# Patient Record
Sex: MALE
[De-identification: names, ages, dates, MRNs, and addresses within clinical notes are randomized per-mention and may not be internally consistent; named-entity substitution may affect disease eponyms.]

---

## 2024-07-16 ENCOUNTER — RESULT REVIEW (OUTPATIENT)
Age: 64
End: 2024-07-16

## 2024-07-16 ENCOUNTER — APPOINTMENT (OUTPATIENT)
Dept: RADIOLOGY | Facility: CLINIC | Age: 64
End: 2024-07-16

## 2024-07-16 ENCOUNTER — OUTPATIENT (OUTPATIENT)
Dept: OUTPATIENT SERVICES | Facility: HOSPITAL | Age: 64
LOS: 1 days | End: 2024-07-16
Payer: MEDICAID

## 2024-07-16 ENCOUNTER — APPOINTMENT (OUTPATIENT)
Dept: RHEUMATOLOGY | Facility: CLINIC | Age: 64
End: 2024-07-16
Payer: MEDICAID

## 2024-07-16 VITALS
SYSTOLIC BLOOD PRESSURE: 134 MMHG | WEIGHT: 200 LBS | HEART RATE: 78 BPM | BODY MASS INDEX: 30.31 KG/M2 | HEIGHT: 68 IN | OXYGEN SATURATION: 96 % | DIASTOLIC BLOOD PRESSURE: 73 MMHG | TEMPERATURE: 97.2 F

## 2024-07-16 DIAGNOSIS — M79.89 OTHER SPECIFIED SOFT TISSUE DISORDERS: ICD-10-CM

## 2024-07-16 DIAGNOSIS — L40.9 PSORIASIS, UNSPECIFIED: ICD-10-CM

## 2024-07-16 PROBLEM — Z00.00 ENCOUNTER FOR PREVENTIVE HEALTH EXAMINATION: Status: ACTIVE | Noted: 2024-07-16

## 2024-07-16 PROCEDURE — 99205 OFFICE O/P NEW HI 60 MIN: CPT

## 2024-07-16 PROCEDURE — 73660 X-RAY EXAM OF TOE(S): CPT | Mod: 26,50

## 2024-07-16 PROCEDURE — G2211 COMPLEX E/M VISIT ADD ON: CPT | Mod: NC,1L

## 2024-07-16 PROCEDURE — 73600 X-RAY EXAM OF ANKLE: CPT | Mod: 26,50

## 2024-07-16 PROCEDURE — 72190 X-RAY EXAM OF PELVIS: CPT | Mod: 26

## 2024-07-17 LAB
ALBUMIN SERPL ELPH-MCNC: 4.2 G/DL
ALP BLD-CCNC: 74 U/L
ALT SERPL-CCNC: 22 U/L
ANION GAP SERPL CALC-SCNC: 14 MMOL/L
AST SERPL-CCNC: 22 U/L
BILIRUB SERPL-MCNC: 0.4 MG/DL
BUN SERPL-MCNC: 11 MG/DL
CALCIUM SERPL-MCNC: 8.9 MG/DL
CHLORIDE SERPL-SCNC: 104 MMOL/L
CO2 SERPL-SCNC: 24 MMOL/L
CREAT SERPL-MCNC: 0.84 MG/DL
CRP SERPL-MCNC: 3 MG/L
EGFR: 97 ML/MIN/1.73M2
ERYTHROCYTE [SEDIMENTATION RATE] IN BLOOD BY WESTERGREN METHOD: 19 MM/HR
GLUCOSE SERPL-MCNC: 83 MG/DL
HCT VFR BLD CALC: 41.8 %
HGB BLD-MCNC: 13.2 G/DL
MCHC RBC-ENTMCNC: 29.3 PG
MCHC RBC-ENTMCNC: 31.6 GM/DL
MCV RBC AUTO: 92.7 FL
PLATELET # BLD AUTO: 202 K/UL
POTASSIUM SERPL-SCNC: 4.2 MMOL/L
PROT SERPL-MCNC: 7 G/DL
RBC # BLD: 4.51 M/UL
RBC # FLD: 15.7 %
RHEUMATOID FACT SER QL: 30 IU/ML
SODIUM SERPL-SCNC: 142 MMOL/L
URATE SERPL-MCNC: 4.4 MG/DL
WBC # FLD AUTO: 5.07 K/UL

## 2024-07-18 LAB
CCP AB SER IA-ACNC: <8 UNITS
HLA-B27 QL NAA+PROBE: NORMAL
RF+CCP IGG SER-IMP: NEGATIVE

## 2024-07-31 ENCOUNTER — APPOINTMENT (OUTPATIENT)
Dept: RHEUMATOLOGY | Facility: CLINIC | Age: 64
End: 2024-07-31

## 2024-07-31 DIAGNOSIS — L40.50 ARTHROPATHIC PSORIASIS, UNSPECIFIED: ICD-10-CM

## 2024-07-31 PROCEDURE — 99215 OFFICE O/P EST HI 40 MIN: CPT

## 2024-07-31 PROCEDURE — G2211 COMPLEX E/M VISIT ADD ON: CPT | Mod: NC,1L

## 2024-07-31 RX ORDER — METHOTREXATE 2.5 MG/1
2.5 TABLET ORAL
Qty: 26 | Refills: 1 | Status: ACTIVE | COMMUNITY
Start: 2024-07-31 | End: 1900-01-01

## 2024-07-31 RX ORDER — PREDNISONE 5 MG/1
5 TABLET ORAL DAILY
Qty: 30 | Refills: 0 | Status: ACTIVE | COMMUNITY
Start: 2024-07-31 | End: 1900-01-01

## 2024-07-31 RX ORDER — FOLIC ACID 1 MG/1
1 TABLET ORAL DAILY
Qty: 90 | Refills: 1 | Status: ACTIVE | COMMUNITY
Start: 2024-07-31 | End: 1900-01-01

## 2024-07-31 NOTE — HISTORY OF PRESENT ILLNESS
[___ Week(s) Ago] : [unfilled] week(s) ago [FreeTextEntry1] : CC  L hand/ wrist  swelling and pain   HPI: 63 y old M with PMH of Cardiac arrest admitted at [facility] 2017 s/p AICD placement for arrythmia as per patient, no records available to review, followed by Cardiology Cardiology  Dr Diez at [facility]  and PMD  Dr VAHE Wilder   [phone]   3 weeks ago started to have pain and swelling  L 3 digit followed with PMD , tx Keflex for one week had  followed by Dr [facility] private [facility] - 7/2024 , agreed to cw Keflex for one week and then also given steroids , finished one week of antibiotics pain improved but still swollen and trouble swallowing worse in the morning  no other joint pain or swelling  occasional foot pain  no lower back pain  history of Skin psoriasis   denies recent procedure no skin wounds no fever, no trauma,  + skin psoriasis, no history of uveitis, denies sob or chest pain, no cough no recurrent infections, no sicca symptoms , no Ryanaud  no oral ulcers, no trouble with swallowing, no abd pain no blood in urine or stool rest of the review symptoms negative    Medications:  Metoprolol  Eliquis  Lipitor twice a week   FH: no RA, no Gout, Psoriasis  SH: no toxic habits  ALL: NKDA

## 2024-07-31 NOTE — DATA REVIEWED
[FreeTextEntry1] : low positive RF 30 , negative CCP, Negative HLA B 27  Uric acid 4.4  normal CRP, and ESR  normal AST and ALT   BL foot x ray and Pelvic x ray. 7/1624   Hammertoe deformities are noted of the lesser digits bilaterally. Mild dorsal midfoot spurring bilaterally but more prominently on the right. Posterior plantar calcaneal spurring is noted bilaterally.  No fracture or erosive change.  Minimal degenerative change of the left hip. Mild lower lumbar degenerative disc disease.

## 2024-07-31 NOTE — ASSESSMENT
[FreeTextEntry1] : 63 y old M with PMH of Cardiac Arrest due to arrhythmia s/p AICD 2017 at [facility] Tx metoprolol and Eliquis ,  Skin psoriasis, with L hand swelling and pain for last 3 weeks no trauma never had similar symptoms in the past , intermittent pain of foot , tx Keflex one  week partial improvement of pain persistent swelling and pain worse in the morning concerning for inflammatory arthritis possible crystal vs psoriatic arthritis , less likely infectious as no source for infection and multiarticular involvement without skin wound or recent procedure  -evaluated by orthopedic surgery recently advised to send us records, was told x ray was no sig changes of L hand -with evaluation had normal ESR and CRP  and  -negative HLA B 27 -low positive RF 30 and negative CCP  -7/16 24 pelvic x ray for SI joint evaluation showed mild DJD hip joints no sig changes of SI joints  -and hand x ray showed -bl foot /ankle x ray to evaluate for DJD vs inflammatory  -for hand arthritis pain and swelling of 3 digit and bl hand stiffness before prednisone was 10 out of 10 improved down to 2 with 6 day course of prednisone 40 mg took for 2 days and then 20 mg and stopped 7/24 -start Methotrexate 15 mg weekly 6 tablets once a week for psoriatic arthritis, side affects discussed, GI symptoms and affects on bone marrow and increased infections with rest of the side affects, will monitor blood work 4 weeks after starting medications -folic acid 1 mg daily with Methotrexate  -if recurrent or worse pain can start low dose prednisone 5 mg daily if needed   -follow up 4 weeks in person

## 2024-09-09 ENCOUNTER — NON-APPOINTMENT (OUTPATIENT)
Age: 64
End: 2024-09-09

## 2024-09-10 ENCOUNTER — APPOINTMENT (OUTPATIENT)
Dept: RHEUMATOLOGY | Facility: CLINIC | Age: 64
End: 2024-09-10
Payer: MEDICAID

## 2024-09-10 VITALS
TEMPERATURE: 97.2 F | HEIGHT: 68 IN | OXYGEN SATURATION: 97 % | BODY MASS INDEX: 30.31 KG/M2 | SYSTOLIC BLOOD PRESSURE: 121 MMHG | DIASTOLIC BLOOD PRESSURE: 75 MMHG | HEART RATE: 71 BPM | WEIGHT: 200 LBS

## 2024-09-10 DIAGNOSIS — L40.50 ARTHROPATHIC PSORIASIS, UNSPECIFIED: ICD-10-CM

## 2024-09-10 PROCEDURE — G2211 COMPLEX E/M VISIT ADD ON: CPT | Mod: NC

## 2024-09-10 PROCEDURE — 99215 OFFICE O/P EST HI 40 MIN: CPT

## 2024-09-10 NOTE — PHYSICAL EXAM
[General Appearance - In No Acute Distress] : in no acute distress [PERRL With Normal Accommodation] : pupils were equal in size, round, and reactive to light [Examination Of The Oral Cavity] : the lips and gums were normal [Oropharynx] : the oropharynx was normal [] : the neck was supple [Respiration, Rhythm And Depth] : normal respiratory rhythm and effort [Auscultation Breath Sounds / Voice Sounds] : lungs were clear to auscultation bilaterally [Chest Palpation] : palpation of the chest revealed no abnormalities [Heart Rate And Rhythm] : heart rate was normal and rhythm regular [Heart Sounds] : normal S1 and S2 [Murmurs] : no murmurs [Edema] : there was no peripheral edema [Bowel Sounds] : normal bowel sounds [Abdomen Soft] : soft [Abdomen Tenderness] : non-tender [No Spinal Tenderness] : no spinal tenderness [Motor Tone] : muscle strength and tone were normal [FreeTextEntry1] : bl elbow scaly rash  [Motor Exam] : the motor exam was normal

## 2024-09-10 NOTE — HISTORY OF PRESENT ILLNESS
[FreeTextEntry1] : 9/10/24 follow up with me much improved pain and swelling of the hands, persistent but improved L had 3 digit swelling and pain 90 percent improvement overall  persistent scaly rash bl elbows and minimal redness around the nose, using topical steroid cream  rest of the review  system negative  tolerating Methotrexate 6 tablets weekly with FA 1 mg daily since 7/31/24 tolerating well , off prednisone  7/16/24  negative HLA B27 , Uric acid 4,4, negative CCP, mildly increased RF 30 , normal ESR and CRP  BL foot and ankle and Pelvis x ray : showing changes suggestive of DJD

## 2024-09-10 NOTE — DATA REVIEWED
[FreeTextEntry1] : low positive RF 30 , negative CCP, Negative HLA B 27  Uric acid 4.4  normal CRP, and ESR  normal AST and ALT   BL foot x ray and Pelvic x ray. 7/1624   Hammertoe deformities are noted of the lesser digits bilaterally. Mild dorsal midfoot spurring bilaterally but more prominently on the right. Posterior plantar calcaneal spurring is noted bilaterally.  No fracture or erosive change.  Minimal degenerative change of the left hip. Mild lower lumbar degenerative disc disease. 
Private car

## 2024-09-10 NOTE — ASSESSMENT
[FreeTextEntry1] : 63 y old M with PMH of Cardiac Arrest due to arrhythmia s/p AICD 2017 at [facility] Tx metoprolol and Eliquis ,  Skin psoriasis, with L hand swelling and pain for last 3 weeks no trauma never had similar symptoms in the past , intermittent pain of foot , tx Keflex one  week partial improvement of pain persistent swelling and pain worse in the morning concerning for inflammatory arthritis possible crystal vs psoriatic arthritis , less likely infectious as no source for infection and multiarticular involvement without skin wound or recent procedure  -evaluated by orthopedic surgery recently advised to send us records, was told x ray was no sig changes of L hand -with workup 7/16/24 negative CCP , HLA B27 low positive RF 30 normal ESR and CRP -BL foot and Pelvic x ray showed changes suggestive of DJD no erosive disease  -7/16 24 pelvic x ray for SI joint evaluation showed mild DJD hip joints no sig changes of SI joints  -bl hand swelling and pain improved with prednisone and 7/31/24 started on Methotrexate for possible psoriatic arthritis with 90 % improved symotoms,  with persistent but markedly improved pain and swelling of L hand 3 PIP joint , persistent scaly elbow rash  -tolerating Methotrexate 15 mg weekly with FA 1 mg daily well advised to increase to 20 mg weekly and cw FA 1 mg daily, side affects discussed -repeat CBC, CMP , ESR and CRP  -monitor blood work 2-3 month  -MTX side affects discussed, GI symptoms and affects on bone marrow and increased infections with rest of the side affects, will monitor blood work 4 weeks after starting medications   -follow up 2-3 month

## 2024-09-11 LAB
ALBUMIN SERPL ELPH-MCNC: 4.1 G/DL
ALP BLD-CCNC: 79 U/L
ALT SERPL-CCNC: 24 U/L
ANION GAP SERPL CALC-SCNC: 13 MMOL/L
AST SERPL-CCNC: 24 U/L
BILIRUB SERPL-MCNC: 0.4 MG/DL
BUN SERPL-MCNC: 13 MG/DL
CALCIUM SERPL-MCNC: 9.5 MG/DL
CHLORIDE SERPL-SCNC: 104 MMOL/L
CHOLEST SERPL-MCNC: 120 MG/DL
CO2 SERPL-SCNC: 23 MMOL/L
CREAT SERPL-MCNC: 0.92 MG/DL
CRP SERPL-MCNC: <3 MG/L
EGFR: 93 ML/MIN/1.73M2
ERYTHROCYTE [SEDIMENTATION RATE] IN BLOOD BY WESTERGREN METHOD: 15 MM/HR
GLUCOSE SERPL-MCNC: 89 MG/DL
HCT VFR BLD CALC: 43 %
HDLC SERPL-MCNC: 43 MG/DL
HGB BLD-MCNC: 13.8 G/DL
LDLC SERPL CALC-MCNC: 61 MG/DL
MCHC RBC-ENTMCNC: 30.2 PG
MCHC RBC-ENTMCNC: 32.1 GM/DL
MCV RBC AUTO: 94.1 FL
NONHDLC SERPL-MCNC: 77 MG/DL
PLATELET # BLD AUTO: 170 K/UL
POTASSIUM SERPL-SCNC: 4.2 MMOL/L
PROT SERPL-MCNC: 6.5 G/DL
RBC # BLD: 4.57 M/UL
RBC # FLD: 16.1 %
SODIUM SERPL-SCNC: 140 MMOL/L
TRIGL SERPL-MCNC: 79 MG/DL
WBC # FLD AUTO: 3.2 K/UL

## 2024-12-10 ENCOUNTER — APPOINTMENT (OUTPATIENT)
Dept: RHEUMATOLOGY | Facility: CLINIC | Age: 64
End: 2024-12-10
Payer: MEDICAID

## 2024-12-10 VITALS
DIASTOLIC BLOOD PRESSURE: 79 MMHG | OXYGEN SATURATION: 96 % | TEMPERATURE: 97 F | WEIGHT: 204 LBS | SYSTOLIC BLOOD PRESSURE: 128 MMHG | HEART RATE: 55 BPM | HEIGHT: 68 IN | BODY MASS INDEX: 30.92 KG/M2

## 2024-12-10 PROCEDURE — G2211 COMPLEX E/M VISIT ADD ON: CPT | Mod: NC

## 2024-12-10 PROCEDURE — 99215 OFFICE O/P EST HI 40 MIN: CPT

## 2024-12-11 LAB
ALBUMIN SERPL ELPH-MCNC: 4.4 G/DL
ALP BLD-CCNC: 82 U/L
ALT SERPL-CCNC: 21 U/L
ANION GAP SERPL CALC-SCNC: 11 MMOL/L
AST SERPL-CCNC: 26 U/L
BILIRUB SERPL-MCNC: 0.4 MG/DL
BUN SERPL-MCNC: 10 MG/DL
CALCIUM SERPL-MCNC: 9.9 MG/DL
CHLORIDE SERPL-SCNC: 102 MMOL/L
CO2 SERPL-SCNC: 26 MMOL/L
CREAT SERPL-MCNC: 0.92 MG/DL
CRP SERPL-MCNC: <3 MG/L
EGFR: 92 ML/MIN/1.73M2
ERYTHROCYTE [SEDIMENTATION RATE] IN BLOOD BY WESTERGREN METHOD: 7 MM/HR
GLUCOSE SERPL-MCNC: 90 MG/DL
HCT VFR BLD CALC: 41.8 %
HGB BLD-MCNC: 13.4 G/DL
MCHC RBC-ENTMCNC: 30.9 PG
MCHC RBC-ENTMCNC: 32.1 G/DL
MCV RBC AUTO: 96.3 FL
PLATELET # BLD AUTO: 172 K/UL
POTASSIUM SERPL-SCNC: 4.7 MMOL/L
PROT SERPL-MCNC: 6.9 G/DL
RBC # BLD: 4.34 M/UL
RBC # FLD: 15.6 %
SODIUM SERPL-SCNC: 139 MMOL/L
WBC # FLD AUTO: 3.51 K/UL

## 2025-02-25 ENCOUNTER — APPOINTMENT (OUTPATIENT)
Dept: RHEUMATOLOGY | Facility: CLINIC | Age: 65
End: 2025-02-25
Payer: MEDICAID

## 2025-02-25 ENCOUNTER — NON-APPOINTMENT (OUTPATIENT)
Age: 65
End: 2025-02-25

## 2025-02-25 VITALS
WEIGHT: 200 LBS | HEIGHT: 68 IN | OXYGEN SATURATION: 98 % | BODY MASS INDEX: 30.31 KG/M2 | SYSTOLIC BLOOD PRESSURE: 135 MMHG | DIASTOLIC BLOOD PRESSURE: 85 MMHG | HEART RATE: 60 BPM | TEMPERATURE: 97.2 F

## 2025-02-25 DIAGNOSIS — L40.50 ARTHROPATHIC PSORIASIS, UNSPECIFIED: ICD-10-CM

## 2025-02-25 PROCEDURE — 99215 OFFICE O/P EST HI 40 MIN: CPT

## 2025-02-25 PROCEDURE — G2211 COMPLEX E/M VISIT ADD ON: CPT | Mod: NC

## 2025-02-25 RX ORDER — LEFLUNOMIDE 10 MG/1
10 TABLET, FILM COATED ORAL DAILY
Qty: 30 | Refills: 3 | Status: ACTIVE | COMMUNITY
Start: 2025-02-25 | End: 1900-01-01

## 2025-02-26 LAB
ALBUMIN SERPL ELPH-MCNC: 4.2 G/DL
ALP BLD-CCNC: 73 U/L
ALT SERPL-CCNC: 28 U/L
ANION GAP SERPL CALC-SCNC: 12 MMOL/L
AST SERPL-CCNC: 28 U/L
BILIRUB SERPL-MCNC: 0.4 MG/DL
BUN SERPL-MCNC: 11 MG/DL
CALCIUM SERPL-MCNC: 9.2 MG/DL
CHLORIDE SERPL-SCNC: 102 MMOL/L
CO2 SERPL-SCNC: 26 MMOL/L
CREAT SERPL-MCNC: 0.9 MG/DL
CRP SERPL-MCNC: <3 MG/L
EGFR: 95 ML/MIN/1.73M2
ERYTHROCYTE [SEDIMENTATION RATE] IN BLOOD BY WESTERGREN METHOD: 6 MM/HR
GLUCOSE SERPL-MCNC: 89 MG/DL
HCT VFR BLD CALC: 41.8 %
HGB BLD-MCNC: 13.2 G/DL
MCHC RBC-ENTMCNC: 30.9 PG
MCHC RBC-ENTMCNC: 31.6 G/DL
MCV RBC AUTO: 97.9 FL
PLATELET # BLD AUTO: 151 K/UL
POTASSIUM SERPL-SCNC: 4.1 MMOL/L
PROT SERPL-MCNC: 6.4 G/DL
RBC # BLD: 4.27 M/UL
RBC # FLD: 16 %
SODIUM SERPL-SCNC: 140 MMOL/L
WBC # FLD AUTO: 4.13 K/UL

## 2025-04-01 ENCOUNTER — APPOINTMENT (OUTPATIENT)
Dept: RHEUMATOLOGY | Facility: CLINIC | Age: 65
End: 2025-04-01
Payer: MEDICAID

## 2025-04-01 VITALS
OXYGEN SATURATION: 97 % | HEIGHT: 68 IN | WEIGHT: 205 LBS | TEMPERATURE: 97.8 F | SYSTOLIC BLOOD PRESSURE: 139 MMHG | DIASTOLIC BLOOD PRESSURE: 79 MMHG | BODY MASS INDEX: 31.07 KG/M2 | HEART RATE: 68 BPM

## 2025-04-01 PROCEDURE — G2211 COMPLEX E/M VISIT ADD ON: CPT | Mod: NC

## 2025-04-01 PROCEDURE — 99215 OFFICE O/P EST HI 40 MIN: CPT

## 2025-04-02 LAB
ALBUMIN SERPL ELPH-MCNC: 4.2 G/DL
ALP BLD-CCNC: 92 U/L
ALT SERPL-CCNC: 40 U/L
ANION GAP SERPL CALC-SCNC: 11 MMOL/L
AST SERPL-CCNC: 32 U/L
BILIRUB SERPL-MCNC: 0.4 MG/DL
BUN SERPL-MCNC: 11 MG/DL
CALCIUM SERPL-MCNC: 9.2 MG/DL
CHLORIDE SERPL-SCNC: 103 MMOL/L
CO2 SERPL-SCNC: 26 MMOL/L
CREAT SERPL-MCNC: 0.87 MG/DL
CRP SERPL-MCNC: <3 MG/L
EGFRCR SERPLBLD CKD-EPI 2021: 96 ML/MIN/1.73M2
ERYTHROCYTE [SEDIMENTATION RATE] IN BLOOD BY WESTERGREN METHOD: 7 MM/HR
GLUCOSE SERPL-MCNC: 79 MG/DL
HCT VFR BLD CALC: 41 %
HGB BLD-MCNC: 13.5 G/DL
MCHC RBC-ENTMCNC: 30.7 PG
MCHC RBC-ENTMCNC: 32.9 G/DL
MCV RBC AUTO: 93.2 FL
PLATELET # BLD AUTO: 161 K/UL
POTASSIUM SERPL-SCNC: 4.1 MMOL/L
PROT SERPL-MCNC: 6.7 G/DL
RBC # BLD: 4.4 M/UL
RBC # FLD: 15.9 %
SODIUM SERPL-SCNC: 139 MMOL/L
WBC # FLD AUTO: 3.83 K/UL

## 2025-06-10 ENCOUNTER — OUTPATIENT (OUTPATIENT)
Dept: OUTPATIENT SERVICES | Facility: HOSPITAL | Age: 65
LOS: 1 days | End: 2025-06-10
Payer: MEDICARE

## 2025-06-10 ENCOUNTER — APPOINTMENT (OUTPATIENT)
Dept: RADIOLOGY | Facility: CLINIC | Age: 65
End: 2025-06-10

## 2025-06-10 ENCOUNTER — APPOINTMENT (OUTPATIENT)
Dept: RHEUMATOLOGY | Facility: CLINIC | Age: 65
End: 2025-06-10
Payer: MEDICARE

## 2025-06-10 VITALS
SYSTOLIC BLOOD PRESSURE: 118 MMHG | WEIGHT: 207 LBS | DIASTOLIC BLOOD PRESSURE: 78 MMHG | HEART RATE: 70 BPM | BODY MASS INDEX: 31.37 KG/M2 | TEMPERATURE: 97.8 F | OXYGEN SATURATION: 98 % | HEIGHT: 68 IN

## 2025-06-10 PROCEDURE — 72100 X-RAY EXAM L-S SPINE 2/3 VWS: CPT | Mod: 26

## 2025-06-10 PROCEDURE — 36415 COLL VENOUS BLD VENIPUNCTURE: CPT

## 2025-06-10 PROCEDURE — G2211 COMPLEX E/M VISIT ADD ON: CPT

## 2025-06-10 PROCEDURE — 99215 OFFICE O/P EST HI 40 MIN: CPT

## 2025-06-10 PROCEDURE — 73521 X-RAY EXAM HIPS BI 2 VIEWS: CPT | Mod: 26

## 2025-06-10 PROCEDURE — 99205 OFFICE O/P NEW HI 60 MIN: CPT

## 2025-06-11 LAB
ALBUMIN SERPL ELPH-MCNC: 4 G/DL
ALP BLD-CCNC: 101 U/L
ALT SERPL-CCNC: 50 U/L
ANION GAP SERPL CALC-SCNC: 14 MMOL/L
AST SERPL-CCNC: 41 U/L
BILIRUB SERPL-MCNC: 0.3 MG/DL
BUN SERPL-MCNC: 11 MG/DL
CALCIUM SERPL-MCNC: 9 MG/DL
CHLORIDE SERPL-SCNC: 100 MMOL/L
CO2 SERPL-SCNC: 24 MMOL/L
CREAT SERPL-MCNC: 0.9 MG/DL
CRP SERPL-MCNC: <3 MG/L
EGFRCR SERPLBLD CKD-EPI 2021: 95 ML/MIN/1.73M2
ERYTHROCYTE [SEDIMENTATION RATE] IN BLOOD BY WESTERGREN METHOD: 10 MM/HR
GLUCOSE SERPL-MCNC: 95 MG/DL
HCT VFR BLD CALC: 40.8 %
HGB BLD-MCNC: 12.9 G/DL
MCHC RBC-ENTMCNC: 30.2 PG
MCHC RBC-ENTMCNC: 31.6 G/DL
MCV RBC AUTO: 95.6 FL
PLATELET # BLD AUTO: 157 K/UL
POTASSIUM SERPL-SCNC: 4.2 MMOL/L
PROT SERPL-MCNC: 6.3 G/DL
RBC # BLD: 4.27 M/UL
RBC # FLD: 16.1 %
SODIUM SERPL-SCNC: 138 MMOL/L
WBC # FLD AUTO: 3.62 K/UL

## 2025-08-26 ENCOUNTER — APPOINTMENT (OUTPATIENT)
Dept: RHEUMATOLOGY | Facility: CLINIC | Age: 65
End: 2025-08-26
Payer: MEDICARE

## 2025-08-26 VITALS
SYSTOLIC BLOOD PRESSURE: 124 MMHG | BODY MASS INDEX: 29.55 KG/M2 | HEIGHT: 68 IN | DIASTOLIC BLOOD PRESSURE: 82 MMHG | HEART RATE: 75 BPM | WEIGHT: 195 LBS | OXYGEN SATURATION: 96 %

## 2025-08-26 DIAGNOSIS — L40.50 ARTHROPATHIC PSORIASIS, UNSPECIFIED: ICD-10-CM

## 2025-08-26 DIAGNOSIS — L40.9 PSORIASIS, UNSPECIFIED: ICD-10-CM

## 2025-08-26 PROCEDURE — 36415 COLL VENOUS BLD VENIPUNCTURE: CPT

## 2025-08-26 PROCEDURE — G2211 COMPLEX E/M VISIT ADD ON: CPT

## 2025-08-26 PROCEDURE — 99215 OFFICE O/P EST HI 40 MIN: CPT

## 2025-08-27 LAB
ALBUMIN SERPL ELPH-MCNC: 4.1 G/DL
ALP BLD-CCNC: 98 U/L
ALT SERPL-CCNC: 37 U/L
ANION GAP SERPL CALC-SCNC: 12 MMOL/L
AST SERPL-CCNC: 41 U/L
BILIRUB SERPL-MCNC: 0.5 MG/DL
BUN SERPL-MCNC: 11 MG/DL
CALCIUM SERPL-MCNC: 9.5 MG/DL
CHLORIDE SERPL-SCNC: 100 MMOL/L
CO2 SERPL-SCNC: 25 MMOL/L
CREAT SERPL-MCNC: 0.86 MG/DL
CRP SERPL-MCNC: 3 MG/L
EGFRCR SERPLBLD CKD-EPI 2021: 96 ML/MIN/1.73M2
ERYTHROCYTE [SEDIMENTATION RATE] IN BLOOD BY WESTERGREN METHOD: 18 MM/HR
GLUCOSE SERPL-MCNC: 89 MG/DL
HCT VFR BLD CALC: 39.4 %
HGB BLD-MCNC: 12.9 G/DL
MCHC RBC-ENTMCNC: 29.3 PG
MCHC RBC-ENTMCNC: 32.7 G/DL
MCV RBC AUTO: 89.3 FL
PLATELET # BLD AUTO: 142 K/UL
POTASSIUM SERPL-SCNC: 4.2 MMOL/L
PROT SERPL-MCNC: 6.6 G/DL
RBC # BLD: 4.41 M/UL
RBC # FLD: 16.6 %
SODIUM SERPL-SCNC: 137 MMOL/L
WBC # FLD AUTO: 3.63 K/UL